# Patient Record
Sex: FEMALE | Race: WHITE | HISPANIC OR LATINO | Employment: UNEMPLOYED | URBAN - METROPOLITAN AREA
[De-identification: names, ages, dates, MRNs, and addresses within clinical notes are randomized per-mention and may not be internally consistent; named-entity substitution may affect disease eponyms.]

---

## 2020-01-25 ENCOUNTER — HOSPITAL ENCOUNTER (EMERGENCY)
Facility: CLINIC | Age: 50
Discharge: HOME | End: 2020-01-25
Attending: EMERGENCY MEDICINE | Admitting: EMERGENCY MEDICINE

## 2020-01-25 ENCOUNTER — APPOINTMENT (OUTPATIENT)
Dept: CT IMAGING | Facility: CLINIC | Age: 50
End: 2020-01-25
Attending: EMERGENCY MEDICINE

## 2020-01-25 VITALS
WEIGHT: 114 LBS | OXYGEN SATURATION: 98 % | RESPIRATION RATE: 16 BRPM | HEIGHT: 59 IN | BODY MASS INDEX: 22.98 KG/M2 | TEMPERATURE: 97.8 F | DIASTOLIC BLOOD PRESSURE: 85 MMHG | HEART RATE: 60 BPM | SYSTOLIC BLOOD PRESSURE: 132 MMHG

## 2020-01-25 DIAGNOSIS — S01.81XA FACIAL LACERATION, INITIAL ENCOUNTER: ICD-10-CM

## 2020-01-25 DIAGNOSIS — S06.0X0A CLOSED HEAD INJURY WITH CONCUSSION, WITHOUT LOSS OF CONSCIOUSNESS, INITIAL ENCOUNTER: ICD-10-CM

## 2020-01-25 PROCEDURE — 70450 CT HEAD/BRAIN W/O DYE: CPT

## 2020-01-25 PROCEDURE — 90715 TDAP VACCINE 7 YRS/> IM: CPT | Performed by: EMERGENCY MEDICINE

## 2020-01-25 PROCEDURE — 90471 IMMUNIZATION ADMIN: CPT

## 2020-01-25 PROCEDURE — 99284 EMERGENCY DEPT VISIT MOD MDM: CPT | Mod: 25

## 2020-01-25 PROCEDURE — 12002 RPR S/N/AX/GEN/TRNK2.6-7.5CM: CPT

## 2020-01-25 PROCEDURE — 25000128 H RX IP 250 OP 636: Performed by: EMERGENCY MEDICINE

## 2020-01-25 PROCEDURE — 25000132 ZZH RX MED GY IP 250 OP 250 PS 637: Performed by: EMERGENCY MEDICINE

## 2020-01-25 RX ORDER — CEPHALEXIN 500 MG/1
1000 CAPSULE ORAL ONCE
Status: COMPLETED | OUTPATIENT
Start: 2020-01-25 | End: 2020-01-25

## 2020-01-25 RX ORDER — ACETAMINOPHEN 325 MG/1
650 TABLET ORAL ONCE
Status: COMPLETED | OUTPATIENT
Start: 2020-01-25 | End: 2020-01-25

## 2020-01-25 RX ADMIN — ACETAMINOPHEN 650 MG: 325 TABLET, FILM COATED ORAL at 12:27

## 2020-01-25 RX ADMIN — CEPHALEXIN 1000 MG: 500 CAPSULE ORAL at 12:27

## 2020-01-25 RX ADMIN — CLOSTRIDIUM TETANI TOXOID ANTIGEN (FORMALDEHYDE INACTIVATED), CORYNEBACTERIUM DIPHTHERIAE TOXOID ANTIGEN (FORMALDEHYDE INACTIVATED), BORDETELLA PERTUSSIS TOXOID ANTIGEN (GLUTARALDEHYDE INACTIVATED), BORDETELLA PERTUSSIS FILAMENTOUS HEMAGGLUTININ ANTIGEN (FORMALDEHYDE INACTIVATED), BORDETELLA PERTUSSIS PERTACTIN ANTIGEN, AND BORDETELLA PERTUSSIS FIMBRIAE 2/3 ANTIGEN 0.5 ML: 5; 2; 2.5; 5; 3; 5 INJECTION, SUSPENSION INTRAMUSCULAR at 12:28

## 2020-01-25 SDOH — HEALTH STABILITY: MENTAL HEALTH: HOW OFTEN DO YOU HAVE A DRINK CONTAINING ALCOHOL?: NEVER

## 2020-01-25 ASSESSMENT — MIFFLIN-ST. JEOR: SCORE: 1057.73

## 2020-01-25 ASSESSMENT — ENCOUNTER SYMPTOMS
NECK PAIN: 1
DIZZINESS: 1
WOUND: 1

## 2020-01-25 NOTE — ED PROVIDER NOTES
"  History     Chief Complaint:  Fall and Head Laceration      used: ipad .      Claudette Mensah is a 49 year old female who presents to the emergency department for evaluation of a fall and head laceration. The patient reports she slipped outside of her house and sustained an unwitnessed fall about 45 minutes prior to arrival and hit her head on a rail, resulting in pain and a laceration behind her left ear. She denies loss of consciousness. She reports dizziness secondary to the incident. She further reports some tightness in her left neck, although she states she felt this prior to falling. She was able to walk back inside after the fall. The patient reports her boyfriend, Alex, was not at her house when she fell and did not cause the laceration. Alex reports he asked the patient some questions after the fall, and she did not seem confused. She denies chance of pregnancy. The patient notes her last tetanus shot was 10-15 years ago.    Allergies:  NKDA     Medications:    The patient is currently on no regular medications.     Past Medical History:    Heart valve problem     Past Surgical History:    Heart surgery    Family History:    No past pertinent family history.     Social History:  Presents with boyfriend.   Never smoker.  Marital Status: Single     Review of Systems   Musculoskeletal: Positive for neck pain.   Skin: Positive for wound.   Neurological: Positive for dizziness. Negative for syncope.   All other systems reviewed and are negative.    Physical Exam     Patient Vitals for the past 24 hrs:   BP Temp Temp src Heart Rate Resp SpO2 Height Weight   01/25/20 1105 136/89 97.8  F (36.6  C) Oral 53 16 100 % 1.499 m (4' 11\") 51.7 kg (114 lb)      Physical Exam  Nursing note and vitals reviewed.  Constitutional:  Appears well-developed and well-nourished. Tearful.   HENT:   Head:    2.5 cm laceration over the left mastoid bone with associated lacmoid tenderness and " swelling. Small amount of    echymosis and swelling to mid pina.   Mouth/Throat:   Oropharynx is clear and moist. No oropharyngeal exudate.   Eyes:    Pupils are equal, round, and reactive to light.   Neck:    Normal range of motion. Neck supple.      No tracheal deviation present. No thyromegaly present. Nontender. Linear erythematous marks to both sides of the neck.   Cardiovascular:  Normal rate, regular rhythm, no murmur   Pulmonary/Chest: Breath sounds are clear and equal without wheezes or crackles.  Abdominal:   Soft. Bowel sounds are normal. Exhibits no distension and      no mass. There is no tenderness.      There is no rebound and no guarding.   Musculoskeletal:  Exhibits no edema.   Lymphadenopathy:  No cervical adenopathy.   Neurological:   Alert and oriented to person, place, and time. GCS 15.  CN 2-12 intact.  and proximal upper extremity strength    strong and equal.  Bilateral lower extremity strength strong and equal, including strong dorsiflexion and    plantarflexion strength.  Sensation intact and equal to the face, arms and legs.  No facial droop or weakness.    Normal speech.  Follows commands and answers questions normally.     Skin:    Skin is warm and dry. No rash noted. No pallor.      Emergency Department Course     Imaging:  Radiographic findings were communicated with the patient and significant other who voiced understanding of the findings.    Head CT w/o Contrast:  Normal CT scan of the head.  As per radiology.    Procedures:    Laceration Repair        LACERATION:  A simple and superficial clean 5.0 cm laceration.      LOCATION:  Left mastoid bone      ANESTHESIA:  Local using lidocaine with epinephrine total of 4 mLs      PREPARATION:  Irrigation and Scrubbing with Shur Clens      DEBRIDEMENT:  no debridement      CLOSURE:  Wound was closed with One Layer.  Skin closed with 5 x 5.0 Prolene using interrupted sutures.    Interventions:  1227 Tylenol 650 mg PO   Keflex 1000 mg  PO  1228 Tdap 0.5 mL IM    Emergency Department Course:  Past medical records, nursing notes, and vitals reviewed.    1110 I performed an exam of the patient as documented above.     Medication administered as noted above.     The patient was sent for a Head CT while in the emergency department, results above.     1323 I rechecked the patient and discussed the results of her workup thus far. I numbed the patient's scalp for the laceration repair.    1358 I performed a laceration repair, details above. I discussed the results of the patient's workup thus far.     Findings and plan explained to the Patient and significant other. Patient discharged home with instructions regarding supportive care, medications, and reasons to return. The importance of close follow-up was reviewed.     Impression & Plan      Medical Decision Making:  This patient presents after a fall from standing, and has a history/exam consistent with a closed head injury with concussion. I asked the patient's significant other to leave the room and questioned the patient about domestic abuse, which she denied. Her injuries are consistent with the reported mechanism of the fall. She reports the red marks on her neck are due to eczema and not concerning for strangulation. Differential diagnosis is broad, and it includes skull fracture, epidural hematoma, subdural hematoma, intracerebral hemorrhage, and traumatic subarachnoid hemorrhage, all of which are highly unlikely in this clinical setting. This patient denies severe headache, seizure, and has no focal neurological findings. The patient did not have prolonged LOC, sleepiness, repeated emesis, poor orientation, or significant irritability. However, given the mechanism of injury, [place] Head CT rules cannot rule out this patient for a head CT. I have discussed the risk/benefit analysis with the patient and family regarding CT imaging. Ultimately, CT scan shows no evidence of the above worrisome  "pathologies.     The patient's laceration was cleaned, evaluated, and repaired, as noted above. Risks/signs of infection and scarring have been discussed with the patient and family. Sutures should be removed in 5-7 days to limit the chances of scarring. The patient will watch for signs of infection, including erythema surrounding the wound, streaking, purulent drainage, or fever/chills. Additionally, I have noted \"red flag\" symptoms that would indicate immediate return to Emergency Department for further workup. These include headaches that get worse, increased drowsiness, strange behavior, repetitive speech, seizures, repeated vomiting, growing confusion, increased irritability, slurred speech, weakness or numbness, and loss of responsiveness. This information will also be provided in writing at discharge. I have discussed the second impact syndrome, and the importance of not sustaining repeated concussion in the next 1-2 weeks.    Diagnosis:    ICD-10-CM   1. Closed head injury with concussion, without loss of consciousness, initial encounter S06.0X0A   2. Facial laceration, initial encounter S01.81XA     Disposition:  discharged to home    Scribe Disclosure:  Janie LAROSE, am serving as a scribe on 1/25/2020 at 11:10 AM to personally document services performed by Annie Gauthier MD based on my observations and the provider's statements to me.      Janie Lin  1/25/2020    EMERGENCY DEPARTMENT       Annie Gauthier MD  01/25/20 1621    "

## 2020-01-25 NOTE — ED AVS SNAPSHOT
Emergency Department  [address] [facility] [address]  Phone:  [phone]  Fax:  [phone]                                    Claudette Mensah   MRN: [id number]    Department:   Emergency Department   Date of Visit:  1/25/2020           After Visit Summary Signature Page    I have received my discharge instructions, and my questions have been answered. I have discussed any challenges I see with this plan with the nurse or doctor.    ..........................................................................................................................................  Patient/Patient Representative Signature      ..........................................................................................................................................  Patient Representative Print Name and Relationship to Patient    ..................................................               ................................................  Date                                   Time    ..........................................................................................................................................  Reviewed by Signature/Title    ...................................................              ..............................................  Date                                               Time          22EPIC Rev 08/18

## 2023-08-03 ENCOUNTER — HOSPITAL ENCOUNTER (EMERGENCY)
Facility: CLINIC | Age: 53
Discharge: HOME | End: 2023-08-04
Attending: EMERGENCY MEDICINE | Admitting: EMERGENCY MEDICINE

## 2023-08-03 DIAGNOSIS — M54.12 CERVICAL RADICULOPATHY: ICD-10-CM

## 2023-08-03 LAB
ATRIAL RATE - MUSE: 75 BPM
BASOPHILS # BLD AUTO: 0 10E3/UL (ref 0–0.2)
BASOPHILS NFR BLD AUTO: 0 %
DIASTOLIC BLOOD PRESSURE - MUSE: NORMAL MMHG
EOSINOPHIL # BLD AUTO: 0.2 10E3/UL (ref 0–0.7)
EOSINOPHIL NFR BLD AUTO: 3 %
ERYTHROCYTE [DISTWIDTH] IN BLOOD BY AUTOMATED COUNT: 13.5 % (ref 10–15)
HCT VFR BLD AUTO: 37.8 % (ref 35–47)
HGB BLD-MCNC: 12.7 G/DL (ref 11.7–15.7)
IMM GRANULOCYTES # BLD: 0 10E3/UL
IMM GRANULOCYTES NFR BLD: 0 %
INTERPRETATION ECG - MUSE: NORMAL
LYMPHOCYTES # BLD AUTO: 1.8 10E3/UL (ref 0.8–5.3)
LYMPHOCYTES NFR BLD AUTO: 25 %
MCH RBC QN AUTO: 30.6 PG (ref 26.5–33)
MCHC RBC AUTO-ENTMCNC: 33.6 G/DL (ref 31.5–36.5)
MCV RBC AUTO: 91 FL (ref 78–100)
MONOCYTES # BLD AUTO: 0.4 10E3/UL (ref 0–1.3)
MONOCYTES NFR BLD AUTO: 5 %
NEUTROPHILS # BLD AUTO: 4.8 10E3/UL (ref 1.6–8.3)
NEUTROPHILS NFR BLD AUTO: 67 %
NRBC # BLD AUTO: 0 10E3/UL
NRBC BLD AUTO-RTO: 0 /100
P AXIS - MUSE: 12 DEGREES
PLATELET # BLD AUTO: 202 10E3/UL (ref 150–450)
PR INTERVAL - MUSE: 160 MS
QRS DURATION - MUSE: 94 MS
QT - MUSE: 422 MS
QTC - MUSE: 471 MS
R AXIS - MUSE: 88 DEGREES
RBC # BLD AUTO: 4.15 10E6/UL (ref 3.8–5.2)
SYSTOLIC BLOOD PRESSURE - MUSE: NORMAL MMHG
T AXIS - MUSE: 33 DEGREES
VENTRICULAR RATE- MUSE: 75 BPM
WBC # BLD AUTO: 7.2 10E3/UL (ref 4–11)

## 2023-08-03 PROCEDURE — 99284 EMERGENCY DEPT VISIT MOD MDM: CPT | Mod: 25

## 2023-08-03 PROCEDURE — 36415 COLL VENOUS BLD VENIPUNCTURE: CPT | Performed by: EMERGENCY MEDICINE

## 2023-08-03 PROCEDURE — 96374 THER/PROPH/DIAG INJ IV PUSH: CPT

## 2023-08-03 PROCEDURE — 84484 ASSAY OF TROPONIN QUANT: CPT | Performed by: EMERGENCY MEDICINE

## 2023-08-03 PROCEDURE — 85025 COMPLETE CBC W/AUTO DIFF WBC: CPT | Performed by: EMERGENCY MEDICINE

## 2023-08-03 PROCEDURE — 93005 ELECTROCARDIOGRAM TRACING: CPT

## 2023-08-03 PROCEDURE — 80053 COMPREHEN METABOLIC PANEL: CPT | Performed by: EMERGENCY MEDICINE

## 2023-08-03 RX ORDER — FERROUS SULFATE 325(65) MG
325 TABLET ORAL
COMMUNITY
Start: 2023-04-17

## 2023-08-03 RX ORDER — KETOROLAC TROMETHAMINE 15 MG/ML
10 INJECTION, SOLUTION INTRAMUSCULAR; INTRAVENOUS ONCE
Status: COMPLETED | OUTPATIENT
Start: 2023-08-04 | End: 2023-08-04

## 2023-08-03 RX ORDER — CYCLOBENZAPRINE HCL 10 MG
10 TABLET ORAL 3 TIMES DAILY PRN
Qty: 20 TABLET | Refills: 0 | Status: SHIPPED | OUTPATIENT
Start: 2023-08-03 | End: 2023-08-09

## 2023-08-03 RX ORDER — METHYLPREDNISOLONE 4 MG
4 TABLET, DOSE PACK ORAL SEE ADMIN INSTRUCTIONS
Qty: 21 TABLET | Refills: 0 | Status: SHIPPED | OUTPATIENT
Start: 2023-08-03

## 2023-08-03 RX ORDER — AMOXICILLIN 500 MG/1
500 CAPSULE ORAL
COMMUNITY

## 2023-08-03 RX ORDER — IBUPROFEN 200 MG
600 TABLET ORAL EVERY 8 HOURS PRN
Qty: 60 TABLET | Refills: 0 | Status: SHIPPED | OUTPATIENT
Start: 2023-08-03

## 2023-08-03 RX ORDER — DIAZEPAM 5 MG
5 TABLET ORAL ONCE
Status: COMPLETED | OUTPATIENT
Start: 2023-08-04 | End: 2023-08-04

## 2023-08-03 RX ORDER — OXYCODONE AND ACETAMINOPHEN 5; 325 MG/1; MG/1
1 TABLET ORAL ONCE
Status: COMPLETED | OUTPATIENT
Start: 2023-08-04 | End: 2023-08-04

## 2023-08-03 ASSESSMENT — ACTIVITIES OF DAILY LIVING (ADL): ADLS_ACUITY_SCORE: 35

## 2023-08-04 VITALS
HEART RATE: 64 BPM | DIASTOLIC BLOOD PRESSURE: 81 MMHG | TEMPERATURE: 97.7 F | SYSTOLIC BLOOD PRESSURE: 124 MMHG | OXYGEN SATURATION: 98 % | RESPIRATION RATE: 11 BRPM

## 2023-08-04 LAB
ALBUMIN SERPL BCG-MCNC: 4.7 G/DL (ref 3.5–5.2)
ALP SERPL-CCNC: 81 U/L (ref 35–104)
ALT SERPL W P-5'-P-CCNC: 15 U/L (ref 0–50)
ANION GAP SERPL CALCULATED.3IONS-SCNC: 12 MMOL/L (ref 7–15)
AST SERPL W P-5'-P-CCNC: 24 U/L (ref 0–45)
BILIRUB SERPL-MCNC: 0.2 MG/DL
BUN SERPL-MCNC: 15 MG/DL (ref 6–20)
CALCIUM SERPL-MCNC: 9.1 MG/DL (ref 8.6–10)
CHLORIDE SERPL-SCNC: 101 MMOL/L (ref 98–107)
CREAT SERPL-MCNC: 0.76 MG/DL (ref 0.51–0.95)
DEPRECATED HCO3 PLAS-SCNC: 24 MMOL/L (ref 22–29)
GFR SERPL CREATININE-BSD FRML MDRD: >90 ML/MIN/1.73M2
GLUCOSE SERPL-MCNC: 112 MG/DL (ref 70–99)
POTASSIUM SERPL-SCNC: 4.2 MMOL/L (ref 3.4–5.3)
PROT SERPL-MCNC: 7.4 G/DL (ref 6.4–8.3)
SODIUM SERPL-SCNC: 137 MMOL/L (ref 136–145)
TROPONIN T SERPL HS-MCNC: <6 NG/L

## 2023-08-04 PROCEDURE — 250N000013 HC RX MED GY IP 250 OP 250 PS 637: Performed by: EMERGENCY MEDICINE

## 2023-08-04 PROCEDURE — 250N000011 HC RX IP 250 OP 636: Mod: JZ | Performed by: EMERGENCY MEDICINE

## 2023-08-04 RX ADMIN — OXYCODONE HYDROCHLORIDE AND ACETAMINOPHEN 1 TABLET: 5; 325 TABLET ORAL at 00:03

## 2023-08-04 RX ADMIN — DIAZEPAM 5 MG: 5 TABLET ORAL at 00:03

## 2023-08-04 RX ADMIN — KETOROLAC TROMETHAMINE 10 MG: 15 INJECTION, SOLUTION INTRAMUSCULAR; INTRAVENOUS at 00:04

## 2023-08-04 NOTE — ED PROVIDER NOTES
History   Chief Complaint:  Neck Pain and Arm Pain     HPI   Claudette Doshi is a 52 year old female who presents with neck pain and left arm pain for 1 year.  States that her pain has worsened over the past few days.  She describes it as sharp pain is worsened with movement with pain to her left arm that has intermittent paresthesia.  This is not new is been ongoing for 1 year and has been worsening.  She denies any weakness.  Denies chest pain she denies shortness of breath.  Denies injury.      Medications:    Flexeril    Past Medical History:    Patent ductus arteriosus      Physical Exam   Patient Vitals for the past 24 hrs:   BP Temp Temp src Pulse Resp SpO2   08/03/23 2304 -- -- -- -- -- 98 %   08/03/23 2302 126/79 -- -- 73 -- --   08/03/23 2227 (!) 194/94 97.7  F (36.5  C) Temporal 61 16 96 %      Physical Exam  Constitutional:  Oriented to person, place, and time.   HENT:   Head:    Normocephalic.   Mouth/Throat:   Oropharynx is clear and moist.   Eyes:    EOM are normal. Pupils are equal, round, and reactive to light.   Neck:    Neck supple.   Cardiovascular:  Normal rate, regular rhythm and normal heart sounds.      Exam reveals no gallop and no friction rub.       No murmur heard.  Pulmonary/Chest:  Effort normal and breath sounds normal.      No respiratory distress. No wheezes. No rales.      No reproducible chest wall pain.  Abdominal:   Soft. No distension. No tenderness. No rebound and no guarding.   Musculoskeletal:  Normal range of motion.  No midline cervical spine tenderness.  She has left cervical paraspinal tenderness and spasm is noted.  Neurological:   Alert and oriented to person, place, and time.           Moves all 4 extremities spontaneously  5/5 strength in all 4 extremities.  Sensation intact to light touch in all 4 extremities.    Skin:    No rash noted. No pallor.       Emergency Department Course   ECG  ECG taken at 2234, ECG read at 2353  Normal sinus rhythm   Nonspecific ST  abnormality  Abnormal ECG   Rate 75 bpm. MA interval 160 ms. QRS duration 94 ms. QT/QTc 422/471 ms. P-R-T axes 12 88 33.     Laboratory:  Labs Ordered and Resulted from Time of ED Arrival to Time of ED Departure   COMPREHENSIVE METABOLIC PANEL - Abnormal       Result Value    Sodium 137      Potassium 4.2      Chloride 101      Carbon Dioxide (CO2) 24      Anion Gap 12      Urea Nitrogen 15.0      Creatinine 0.76      Calcium 9.1      Glucose 112 (*)     Alkaline Phosphatase 81      AST 24      ALT 15      Protein Total 7.4      Albumin 4.7      Bilirubin Total 0.2      GFR Estimate >90     TROPONIN T, HIGH SENSITIVITY - Normal    Troponin T, High Sensitivity <6     CBC WITH PLATELETS AND DIFFERENTIAL    WBC Count 7.2      RBC Count 4.15      Hemoglobin 12.7      Hematocrit 37.8      MCV 91      MCH 30.6      MCHC 33.6      RDW 13.5      Platelet Count 202      % Neutrophils 67      % Lymphocytes 25      % Monocytes 5      % Eosinophils 3      % Basophils 0      % Immature Granulocytes 0      NRBCs per 100 WBC 0      Absolute Neutrophils 4.8      Absolute Lymphocytes 1.8      Absolute Monocytes 0.4      Absolute Eosinophils 0.2      Absolute Basophils 0.0      Absolute Immature Granulocytes 0.0      Absolute NRBCs 0.0        Emergency Department Course & Assessments:     Interventions:  Medications   ketorolac (TORADOL) injection 10 mg (has no administration in time range)   oxyCODONE-acetaminophen (PERCOCET) 5-325 MG per tablet 1 tablet (1 tablet Oral $Given 8/4/23 0003)   diazepam (VALIUM) tablet 5 mg (5 mg Oral $Given 8/4/23 0003)      Assessments:  I obtained history and performed an exam of the patient as documented above.    Independent Interpretation (X-rays, CTs, rhythm strip):  None    Consultations/Discussion of Management or Tests:  None       Disposition:  The patient was discharged to home.     Impression & Plan    Medical Decision Making:  Claudette Doshi is a 52 year old female who presents with  neck pain and radicular symptoms.  The patient did not sustain any trauma, therefore x-rays are not necessary due to the low likelihood of fracture or subluxation. Advanced imaging with CT/MRI is not indicated at this time, but may be indicated in the future if symptoms fail to resolve.  The patient has not had a fever, saddle/perineal anesthesia, bilateral foot numbness, or bowel or bladder dysfunction.  There is no midline tenderness to percussion or clinical evidence of cauda equina syndrome, discitis, spinal/epidural space hematoma or epidural abscess and nothing in the history that raises red flags for these pathologies.  The neurological exam is normal.  The patient was advised that radiculopathy often takes significant time to resolve, and that follow up with primary care, neurology and/or neurosurgery will be indicated if symptoms do not improve. The patient will be discharged with pain medications to use as directed, and steroids to attempt to assist with nerve inflammation.  No heavy lifting, bending or twisting. Return if increasing pain, muscular weakness, or bowel or bladder dysfunction.    Diagnosis:    ICD-10-CM    1. Cervical radiculopathy  M54.12          Discharge Medications:  New Prescriptions    CYCLOBENZAPRINE (FLEXERIL) 10 MG TABLET    Take 1 tablet (10 mg) by mouth 3 times daily as needed for muscle spasms    IBUPROFEN (ADVIL/MOTRIN) 200 MG TABLET    Take 3 tablets (600 mg) by mouth every 8 hours as needed for mild pain    METHYLPREDNISOLONE (MEDROL DOSEPAK) 4 MG TABLET THERAPY PACK    Take 1 tablet (4 mg) by mouth See Admin Instructions      Scribe Disclosure:  I, Carol Ann Torres, am serving as a scribe at 12:05 AM on 8/4/2023 to document services personally performed by Alex Reno MD based on my observations and the provider's statements to me.        Alex Reno MD  08/04/23 7498

## 2023-08-04 NOTE — ED TRIAGE NOTES
[facility]  ED Arrival Note    Arrived to triage c/o left sided neck and arm pain that has been going on since last night.  Pt states that her left arm feels numb. The pain has been intermittent throughout the day and she is concerned d/t her cardiac history (Patent Ductus Arteriosus). She took 162 MG ASA, PTA.    Visitors during triage: Daughter; Significant Other.      Triage Interventions: EKG    Ambulatory: Yes    Directed to: Main ED    Pronouns: she/her       Triage Assessment       Row Name 08/03/23 4633       Triage Assessment (Adult)    Airway WDL WDL       Respiratory WDL    Respiratory WDL WDL;rhythm/pattern    Rhythm/Pattern, Respiratory depth regular;pattern regular       Cardiac WDL    Cardiac WDL chest pain;X        Chest Pain Assessment    Chest Pain Location arm, left;shoulder, left    Chest Pain Radiation neck;shoulder;arm     Character aching;throbbing     Chest Pain Intervention cardiac biomarkers drawn;cardiac monitoring continued;cardiac monitor placed;12-lead ECG obtained;aspirin given       Cognitive/Neuro/Behavioral WDL    Cognitive/Neuro/Behavioral WDL WDL;all       [place] Coma Scale    Best Eye Response 4-->(E4) spontaneous    Best Motor Response 6-->(M6) obeys commands    Best Verbal Response 5-->(V5) oriented    [place] Coma Scale Score 15

## 2023-08-04 NOTE — ED NOTES
Pt discharged home with family, discharge paperwork reviewed with pt, verbalized understanding. Vitals stable. Pt left with all belongings.